# Patient Record
Sex: MALE | Race: WHITE | HISPANIC OR LATINO | URBAN - METROPOLITAN AREA
[De-identification: names, ages, dates, MRNs, and addresses within clinical notes are randomized per-mention and may not be internally consistent; named-entity substitution may affect disease eponyms.]

---
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
## 2024-01-16 ENCOUNTER — EMERGENCY (EMERGENCY)
Facility: HOSPITAL | Age: 19
LOS: 1 days | Discharge: HOME | End: 2024-01-16
Attending: EMERGENCY MEDICINE
Payer: MEDICAID

## 2024-01-16 VITALS
HEART RATE: 87 BPM | OXYGEN SATURATION: 100 % | SYSTOLIC BLOOD PRESSURE: 146 MMHG | WEIGHT: 200.18 LBS | TEMPERATURE: 99 F | DIASTOLIC BLOOD PRESSURE: 94 MMHG | RESPIRATION RATE: 17 BRPM

## 2024-01-16 PROCEDURE — 99283 EMERGENCY DEPT VISIT LOW MDM: CPT

## 2024-01-16 PROCEDURE — 99284 EMERGENCY DEPT VISIT MOD MDM: CPT

## 2024-01-16 PROCEDURE — 73030 X-RAY EXAM OF SHOULDER: CPT | Mod: 26,LT

## 2024-01-16 PROCEDURE — 73030 X-RAY EXAM OF SHOULDER: CPT

## 2024-01-16 NOTE — ED ADULT NURSE NOTE - NSFALLUNIVINTERV_ED_ALL_ED
Bed/Stretcher in lowest position, wheels locked, appropriate side rails in place/Call bell, personal items and telephone in reach/Instruct patient to call for assistance before getting out of bed/chair/stretcher/Non-slip footwear applied when patient is off stretcher/[place] to call system/Physically safe environment - no spills, clutter or unnecessary equipment/Purposeful proactive rounding/Room/bathroom lighting operational, light cord in reach Bed/Stretcher in lowest position, wheels locked, appropriate side rails in place/Call bell, personal items and telephone in reach/Instruct patient to call for assistance before getting out of bed/chair/stretcher/Non-slip footwear applied when patient is off stretcher/[place] to call system/Physically safe environment - no spills, clutter or unnecessary equipment/Purposeful proactive rounding/Room/bathroom lighting operational, light cord in reach

## 2024-01-16 NOTE — ED PROVIDER NOTE - MUSCULOSKELETAL MINIMAL EXAM
FROM left shoulder intact, with pain elicited. No swelling or obvious deformity. Normal strength. NVI.

## 2024-01-16 NOTE — ED ADULT TRIAGE NOTE - CHIEF COMPLAINT QUOTE
"I think my shoulder is out of place" pt reports heavy lifting @ gym yesterday. + pulse ,  has limited ROM w/ pain

## 2024-01-16 NOTE — ED PROVIDER NOTE - CARE PROVIDER_API CALL
Alec Matos  Orthopaedic Surgery  [address] [place] [address]  Phone: [phone]  Fax: [phone]  Follow Up Time:    Alec Matos  Orthopaedic Surgery  [address] [place] [address]  Phone: [phone]  Fax: [phone]  Follow Up Time:

## 2024-01-16 NOTE — ED PROVIDER NOTE - CARE PROVIDERS DIRECT ADDRESSES
[email or web address]

## 2024-01-16 NOTE — ED PROVIDER NOTE - OBJECTIVE STATEMENT
19 y/o male, denies PMHx, presents with left shoulder pain since last night. Patient reports that he was benching at the gym when he felt his shoulder pop out and back in. Denies other injuries. Patient states that he feels like it popped out a second time. Reports prior shoulder dislocation. Denies dizziness, headache, shortness of breath, chest pain, abdominal pain, nausea, vomiting, numbness, tingling or other complaints.

## 2024-01-16 NOTE — ED PROVIDER NOTE - ATTENDING APP SHARED VISIT CONTRIBUTION OF CARE
Kendall CHAMBERSMJ-19-oqqw-old male presents with left shoulder pain while doing bench presses.  Patient feels like his shoulder popped out any lidocaine.  Patient states that he is reportedly doing when he does exercise.  Patient has not followed up with cardiac.    Patient is alert well-appearing male, S1-S2 normal regular, bilateral clear breath sounds, abdomen soft nontender nondistended, bilateral shoulder exam is normal no focal tenderness normal range of motion no deformity, neuro exam alert oriented x 3 no focal deficits, skin warm dry good turgor    Plan to do x-ray of the left shoulder with.  X-ray appears normal no evidence of dislocation or fracture.  Patient might have a ligamentous injury placed in a sling and advised to follow-up with Ortho and avoid any bench presses or exercises involving the shoulder Kendall CHAMBERSFO-64-oeoi-old male presents with left shoulder pain while doing bench presses.  Patient feels like his shoulder popped out any lidocaine.  Patient states that he is reportedly doing when he does exercise.  Patient has not followed up with cardiac.    Patient is alert well-appearing male, S1-S2 normal regular, bilateral clear breath sounds, abdomen soft nontender nondistended, bilateral shoulder exam is normal no focal tenderness normal range of motion no deformity, neuro exam alert oriented x 3 no focal deficits, skin warm dry good turgor    Plan to do x-ray of the left shoulder with.  X-ray appears normal no evidence of dislocation or fracture.  Patient might have a ligamentous injury placed in a sling and advised to follow-up with Ortho and avoid any bench presses or exercises involving the shoulder

## 2024-01-16 NOTE — ED PROVIDER NOTE - NSFOLLOWUPINSTRUCTIONS_ED_ALL_ED_FT
Take Tylenol or ibuprofen over the counter for pain as needed. Call to make an appointment to follow up with the orthopedist.

## 2024-01-16 NOTE — ED PROVIDER NOTE - CLINICAL SUMMARY MEDICAL DECISION MAKING FREE TEXT BOX
17 y/o male, denies PMHx, presents with left shoulder pain since last night, r/o dislocation. FROM of left shoulder with pain elicited, no obvious deformity. Offered analgesia, refused at this time. Xray left shoulder. Reassess. 19 y/o male, denies PMHx, presents with left shoulder pain since last night, r/o dislocation. FROM of left shoulder with pain elicited, no obvious deformity. Offered analgesia, refused at this time. Xray left shoulder reviewed, no evidence of dislocation or fracture. Sling applied. No further ED workup indicated at this time. Supportive care. Follow up with ortho. Patient verbally demonstrated understanding of results and plan. Patient stable for discharge. 17 y/o male, denies PMHx, presents with left shoulder pain since last night, r/o dislocation. FROM of left shoulder with pain elicited, no obvious deformity. Offered analgesia, refused at this time. Xray left shoulder reviewed, no evidence of dislocation or fracture. Sling applied. No further ED workup indicated at this time. Supportive care. Follow up with ortho. Patient verbally demonstrated understanding of results and plan. Patient stable for discharge.

## 2024-01-16 NOTE — ED PROVIDER NOTE - PATIENT PORTAL LINK FT
You can access the FollowMyHealth Patient Portal offered by [facility] by registering at the following website: [email or web address]. By joining [employer]’s FollowMyHealth portal, you will also be able to view your health information using other applications (apps) compatible with our system. You can access the FollowMyHealth Patient Portal offered by [facility] by registering at the following website: [email or web address]. By joining [employer]’s FollowMyHealth portal, you will also be able to view your health information using other applications (apps) compatible with our system.

## 2024-01-16 NOTE — ED ADULT NURSE NOTE - OBJECTIVE STATEMENT
Pt is AAOX3. Pt has c/o left shoulder pain since ystdy. States he was lifting weights and heard a pop. Pt states he has dislocated his shoulder in the past and wants an Xray. Pt able to perform ROM with arm

## 2024-01-16 NOTE — ED PROCEDURE NOTE - CPROC ED TIME OUT STATEMENT1
“Patient's name, , procedure and correct site were confirmed during the [place] Timeout.” “Patient's name, , procedure and correct site were confirmed during the [place] Timeout.”

## 2024-01-26 ENCOUNTER — EMERGENCY (EMERGENCY)
Facility: HOSPITAL | Age: 19
LOS: 1 days | Discharge: HOME | End: 2024-01-26
Attending: EMERGENCY MEDICINE
Payer: MEDICAID

## 2024-01-26 VITALS
OXYGEN SATURATION: 98 % | TEMPERATURE: 99 F | SYSTOLIC BLOOD PRESSURE: 126 MMHG | WEIGHT: 200.84 LBS | RESPIRATION RATE: 18 BRPM | HEART RATE: 74 BPM | DIASTOLIC BLOOD PRESSURE: 84 MMHG

## 2024-01-26 PROCEDURE — 71101 X-RAY EXAM UNILAT RIBS/CHEST: CPT | Mod: 26

## 2024-01-26 PROCEDURE — 99284 EMERGENCY DEPT VISIT MOD MDM: CPT

## 2024-01-26 PROCEDURE — 99283 EMERGENCY DEPT VISIT LOW MDM: CPT

## 2024-01-26 PROCEDURE — 71101 X-RAY EXAM UNILAT RIBS/CHEST: CPT

## 2024-01-26 RX ORDER — METHOCARBAMOL 500 MG/1
500 TABLET, FILM COATED ORAL ONCE
Refills: 0 | Status: COMPLETED | OUTPATIENT
Start: 2024-01-26 | End: 2024-01-26

## 2024-01-26 RX ORDER — IBUPROFEN 200 MG
600 TABLET ORAL ONCE
Refills: 0 | Status: COMPLETED | OUTPATIENT
Start: 2024-01-26 | End: 2024-01-26

## 2024-01-26 RX ADMIN — METHOCARBAMOL 500 MILLIGRAM(S): 500 TABLET, FILM COATED ORAL at 19:19

## 2024-01-26 RX ADMIN — Medication 600 MILLIGRAM(S): at 19:19

## 2024-01-26 NOTE — ED PROVIDER NOTE - CLINICAL SUMMARY MEDICAL DECISION MAKING FREE TEXT BOX
18 years old male no past medical history presented emergency room complaining of the left-sided rib cage pain feeling bulging for 2 weeks. status post he was working up in the gym when the pain began. pain worse by certain movements. he did not take any pain medication since then. has any fever chills cough. denies abdominal pain nausea vomiting diarrhea   explained the patient that likely is musculoskeletal  - will obtain chest x-ray ibuprofen muscle relaxant  and reevaluation

## 2024-01-26 NOTE — ED PROVIDER NOTE - NSFOLLOWUPINSTRUCTIONS_ED_ALL_ED_FT
Chest Wall Pain    Chest wall pain is pain in or around the bones and muscles of your chest. Chest wall pain may be caused by:    An injury.  Coughing a lot.  Using your chest and arm muscles too much.    Sometimes, the cause may not be known. This pain may take a few weeks or longer to get better.    Follow these instructions at home:      Managing pain, stiffness, and swelling     If told, put ice on the painful area:    Put ice in a plastic bag.  Place a towel between your skin and the bag.  Leave the ice on for 20 minutes, 2–3 times a day.        Activity    Rest as told by your doctor.  Avoid doing things that cause pain. This includes lifting heavy items.  Ask your doctor what activities are safe for you.        General instructions     Take over-the-counter and prescription medicines only as told by your doctor.  Do not use any products that contain nicotine or tobacco, such as cigarettes, e-cigarettes, and chewing tobacco. If you need help quitting, ask your doctor.  Keep all follow-up visits as told by your doctor. This is important.    Contact a doctor if:  You have a fever.  Your chest pain gets worse.  You have new symptoms.    Get help right away if:  You feel sick to your stomach (nauseous) or you throw up (vomit).  You feel sweaty or light-headed.  You have a cough with mucus from your lungs (sputum) or you cough up blood.  You are short of breath.    These symptoms may be an emergency. Do not wait to see if the symptoms will go away. Get medical help right away. Call your local emergency services (911 in the [place] Do not drive yourself to the hospital.    Summary  Chest wall pain is pain in or around the bones and muscles of your chest.  It may be treated with ice, rest, and medicines. Your condition may also get better if you avoid doing things that cause pain.  Contact a doctor if you have a fever, chest pain that gets worse, or new symptoms.  Get help right away if you feel light-headed or you get short of breath. These symptoms may be an emergency.    ADDITIONAL NOTES AND INSTRUCTIONS    Please follow up with your Primary MD in 24-48 hr.  Seek immediate medical care for any new/worsening signs or symptoms. Ibuprofen  600 mg every 8 hours as needed for the pain, cold compress   x-ray consistent with consistent  with  constipation and as well. vegetables: prune Juice will help you with the constipation- you can use over-the-counter MiraLAX for the constipation as well  call and follow up with primary care Within 1-2 days     Chest Wall Pain    Chest wall pain is pain in or around the bones and muscles of your chest. Chest wall pain may be caused by:    An injury.  Coughing a lot.  Using your chest and arm muscles too much.    Sometimes, the cause may not be known. This pain may take a few weeks or longer to get better.    Follow these instructions at home:      Managing pain, stiffness, and swelling     If told, put ice on the painful area:    Put ice in a plastic bag.  Place a towel between your skin and the bag.  Leave the ice on for 20 minutes, 2–3 times a day.        Activity    Rest as told by your doctor.  Avoid doing things that cause pain. This includes lifting heavy items.  Ask your doctor what activities are safe for you.        General instructions     Take over-the-counter and prescription medicines only as told by your doctor.  Do not use any products that contain nicotine or tobacco, such as cigarettes, e-cigarettes, and chewing tobacco. If you need help quitting, ask your doctor.  Keep all follow-up visits as told by your doctor. This is important.    Contact a doctor if:  You have a fever.  Your chest pain gets worse.  You have new symptoms.    Get help right away if:  You feel sick to your stomach (nauseous) or you throw up (vomit).  You feel sweaty or light-headed.  You have a cough with mucus from your lungs (sputum) or you cough up blood.  You are short of breath.    These symptoms may be an emergency. Do not wait to see if the symptoms will go away. Get medical help right away. Call your local emergency services (911 in the [place] Do not drive yourself to the hospital.    Summary  Chest wall pain is pain in or around the bones and muscles of your chest.  It may be treated with ice, rest, and medicines. Your condition may also get better if you avoid doing things that cause pain.  Contact a doctor if you have a fever, chest pain that gets worse, or new symptoms.  Get help right away if you feel light-headed or you get short of breath. These symptoms may be an emergency.    ADDITIONAL NOTES AND INSTRUCTIONS    Please follow up with your Primary MD in 24-48 hr.  Seek immediate medical care for any new/worsening signs or symptoms.

## 2024-01-26 NOTE — ED ADULT NURSE NOTE - OBJECTIVE STATEMENT
Pt a&ox3, in ED for left rib pain x 1.5 weeks after bench pressing, the weight fell on the pt, no SOB, unlabored breathing, equal rise & fall, able to speak in full sentences, no N/V/D, no PMH/PSH

## 2024-01-26 NOTE — ED PROVIDER NOTE - OBJECTIVE STATEMENT
18 years old male no past medical history presented emergency room complaining of the left-sided rib cage pain feeling bulging for 2 weeks. status post he was working up in the gym when the pain began. pain worse by certain movements. he did not take any pain medication since then. has any fever chills cough. denies abdominal pain nausea vomiting diarrhea

## 2024-01-26 NOTE — ED PROVIDER NOTE - PATIENT PORTAL LINK FT
You can access the FollowMyHealth Patient Portal offered by [facility] by registering at the following website: [email or web address]. By joining [employer]’s FollowMyHealth portal, you will also be able to view your health information using other applications (apps) compatible with our system.

## 2024-01-26 NOTE — ED PROVIDER NOTE - ATTENDING APP SHARED VISIT CONTRIBUTION OF CARE
independent exam  Pt with L lower rib pain  no trauma  pe no palpable crepitus or fragment  xray ribs no obvious fx  agree w symptomatic care  copious stool in R and Transverse colon noted on rib film  pt advised to increase water fruits and vagetables

## 2024-01-26 NOTE — ED PROVIDER NOTE - PHYSICAL EXAMINATION
Const: AOX3 nontoxic appearing, no apparent respiratory or physical distress. Stable gait   HEENT: NC/AT. Moist mucous membranes.  Eyes: HERNÁN. EOMI  Neck: Soft and supple. Full ROM without pain.  Cardiac: Regular rate and regular rhythm. +S1/S2. left side of the chest wall with cage area around anterior rib 8 or 9 tenderness to palpation no deformities  noted  Resp: Speaking in full sentences. No evidence of respiratory distress. No wheezes, rales or rhonchi. No adventitious breath sounds   Abd: Soft, non-tender, non-distended.   Skin: No rashes, abrasions or lacerations.  Neuro: Awake, alert & oriented x 3. Moves all extremities symmetrically.

## 2024-01-27 PROBLEM — Z78.9 OTHER SPECIFIED HEALTH STATUS: Chronic | Status: ACTIVE | Noted: 2024-01-16

## 2024-06-07 NOTE — ED PROVIDER NOTE - NEURO NEGATIVE STATEMENT, MLM
no loss of consciousness, no gait abnormality, no headache, no sensory deficits, and no weakness. patient
